# Patient Record
Sex: FEMALE | Race: OTHER | NOT HISPANIC OR LATINO | URBAN - METROPOLITAN AREA
[De-identification: names, ages, dates, MRNs, and addresses within clinical notes are randomized per-mention and may not be internally consistent; named-entity substitution may affect disease eponyms.]

---

## 2021-03-08 PROBLEM — Z00.00 ENCOUNTER FOR PREVENTIVE HEALTH EXAMINATION: Status: ACTIVE | Noted: 2021-03-08

## 2021-03-16 ENCOUNTER — EMERGENCY (EMERGENCY)
Facility: HOSPITAL | Age: 26
LOS: 0 days | Discharge: HOME | End: 2021-03-16
Attending: EMERGENCY MEDICINE | Admitting: EMERGENCY MEDICINE
Payer: COMMERCIAL

## 2021-03-16 VITALS
SYSTOLIC BLOOD PRESSURE: 175 MMHG | RESPIRATION RATE: 20 BRPM | OXYGEN SATURATION: 100 % | WEIGHT: 175.05 LBS | HEART RATE: 108 BPM | TEMPERATURE: 99 F | DIASTOLIC BLOOD PRESSURE: 84 MMHG

## 2021-03-16 VITALS
SYSTOLIC BLOOD PRESSURE: 116 MMHG | DIASTOLIC BLOOD PRESSURE: 64 MMHG | RESPIRATION RATE: 20 BRPM | OXYGEN SATURATION: 100 % | HEART RATE: 80 BPM | TEMPERATURE: 97 F

## 2021-03-16 DIAGNOSIS — O03.9 COMPLETE OR UNSPECIFIED SPONTANEOUS ABORTION WITHOUT COMPLICATION: ICD-10-CM

## 2021-03-16 DIAGNOSIS — N93.9 ABNORMAL UTERINE AND VAGINAL BLEEDING, UNSPECIFIED: ICD-10-CM

## 2021-03-16 LAB
ALBUMIN SERPL ELPH-MCNC: 4.2 G/DL — SIGNIFICANT CHANGE UP (ref 3.5–5.2)
ALP SERPL-CCNC: 71 U/L — SIGNIFICANT CHANGE UP (ref 30–115)
ALT FLD-CCNC: 18 U/L — SIGNIFICANT CHANGE UP (ref 0–41)
ANION GAP SERPL CALC-SCNC: 12 MMOL/L — SIGNIFICANT CHANGE UP (ref 7–14)
APPEARANCE UR: CLEAR — SIGNIFICANT CHANGE UP
APTT BLD: 30.2 SEC — SIGNIFICANT CHANGE UP (ref 27–39.2)
AST SERPL-CCNC: 21 U/L — SIGNIFICANT CHANGE UP (ref 0–41)
BASOPHILS # BLD AUTO: 0.05 K/UL — SIGNIFICANT CHANGE UP (ref 0–0.2)
BASOPHILS NFR BLD AUTO: 0.6 % — SIGNIFICANT CHANGE UP (ref 0–1)
BILIRUB SERPL-MCNC: <0.2 MG/DL — SIGNIFICANT CHANGE UP (ref 0.2–1.2)
BILIRUB UR-MCNC: NEGATIVE — SIGNIFICANT CHANGE UP
BLD GP AB SCN SERPL QL: SIGNIFICANT CHANGE UP
BUN SERPL-MCNC: 9 MG/DL — LOW (ref 10–20)
CALCIUM SERPL-MCNC: 9.3 MG/DL — SIGNIFICANT CHANGE UP (ref 8.5–10.1)
CHLORIDE SERPL-SCNC: 103 MMOL/L — SIGNIFICANT CHANGE UP (ref 98–110)
CO2 SERPL-SCNC: 24 MMOL/L — SIGNIFICANT CHANGE UP (ref 17–32)
COLOR SPEC: SIGNIFICANT CHANGE UP
CREAT SERPL-MCNC: 0.8 MG/DL — SIGNIFICANT CHANGE UP (ref 0.7–1.5)
DIFF PNL FLD: ABNORMAL
EOSINOPHIL # BLD AUTO: 0.15 K/UL — SIGNIFICANT CHANGE UP (ref 0–0.7)
EOSINOPHIL NFR BLD AUTO: 1.8 % — SIGNIFICANT CHANGE UP (ref 0–8)
GLUCOSE SERPL-MCNC: 98 MG/DL — SIGNIFICANT CHANGE UP (ref 70–99)
GLUCOSE UR QL: NEGATIVE — SIGNIFICANT CHANGE UP
HCG SERPL-ACNC: 14.9 MIU/ML — HIGH
HCT VFR BLD CALC: 41.7 % — SIGNIFICANT CHANGE UP (ref 37–47)
HGB BLD-MCNC: 13.5 G/DL — SIGNIFICANT CHANGE UP (ref 12–16)
IMM GRANULOCYTES NFR BLD AUTO: 0.2 % — SIGNIFICANT CHANGE UP (ref 0.1–0.3)
INR BLD: 1.07 RATIO — SIGNIFICANT CHANGE UP (ref 0.65–1.3)
KETONES UR-MCNC: NEGATIVE — SIGNIFICANT CHANGE UP
LEUKOCYTE ESTERASE UR-ACNC: NEGATIVE — SIGNIFICANT CHANGE UP
LIDOCAIN IGE QN: 45 U/L — SIGNIFICANT CHANGE UP (ref 7–60)
LYMPHOCYTES # BLD AUTO: 2.49 K/UL — SIGNIFICANT CHANGE UP (ref 1.2–3.4)
LYMPHOCYTES # BLD AUTO: 30.7 % — SIGNIFICANT CHANGE UP (ref 20.5–51.1)
MCHC RBC-ENTMCNC: 28.5 PG — SIGNIFICANT CHANGE UP (ref 27–31)
MCHC RBC-ENTMCNC: 32.4 G/DL — SIGNIFICANT CHANGE UP (ref 32–37)
MCV RBC AUTO: 88.2 FL — SIGNIFICANT CHANGE UP (ref 81–99)
MONOCYTES # BLD AUTO: 0.55 K/UL — SIGNIFICANT CHANGE UP (ref 0.1–0.6)
MONOCYTES NFR BLD AUTO: 6.8 % — SIGNIFICANT CHANGE UP (ref 1.7–9.3)
NEUTROPHILS # BLD AUTO: 4.85 K/UL — SIGNIFICANT CHANGE UP (ref 1.4–6.5)
NEUTROPHILS NFR BLD AUTO: 59.9 % — SIGNIFICANT CHANGE UP (ref 42.2–75.2)
NITRITE UR-MCNC: NEGATIVE — SIGNIFICANT CHANGE UP
NRBC # BLD: 0 /100 WBCS — SIGNIFICANT CHANGE UP (ref 0–0)
PH UR: 6.5 — SIGNIFICANT CHANGE UP (ref 5–8)
PLATELET # BLD AUTO: 267 K/UL — SIGNIFICANT CHANGE UP (ref 130–400)
POTASSIUM SERPL-MCNC: 4.2 MMOL/L — SIGNIFICANT CHANGE UP (ref 3.5–5)
POTASSIUM SERPL-SCNC: 4.2 MMOL/L — SIGNIFICANT CHANGE UP (ref 3.5–5)
PROT SERPL-MCNC: 6.9 G/DL — SIGNIFICANT CHANGE UP (ref 6–8)
PROT UR-MCNC: SIGNIFICANT CHANGE UP
PROTHROM AB SERPL-ACNC: 12.3 SEC — SIGNIFICANT CHANGE UP (ref 9.95–12.87)
RBC # BLD: 4.73 M/UL — SIGNIFICANT CHANGE UP (ref 4.2–5.4)
RBC # FLD: 11.8 % — SIGNIFICANT CHANGE UP (ref 11.5–14.5)
SODIUM SERPL-SCNC: 139 MMOL/L — SIGNIFICANT CHANGE UP (ref 135–146)
SP GR SPEC: 1.01 — SIGNIFICANT CHANGE UP (ref 1.01–1.03)
UROBILINOGEN FLD QL: SIGNIFICANT CHANGE UP
WBC # BLD: 8.11 K/UL — SIGNIFICANT CHANGE UP (ref 4.8–10.8)
WBC # FLD AUTO: 8.11 K/UL — SIGNIFICANT CHANGE UP (ref 4.8–10.8)

## 2021-03-16 PROCEDURE — 99285 EMERGENCY DEPT VISIT HI MDM: CPT

## 2021-03-16 PROCEDURE — 76830 TRANSVAGINAL US NON-OB: CPT | Mod: 26

## 2021-03-16 RX ORDER — SODIUM CHLORIDE 9 MG/ML
1000 INJECTION INTRAMUSCULAR; INTRAVENOUS; SUBCUTANEOUS ONCE
Refills: 0 | Status: COMPLETED | OUTPATIENT
Start: 2021-03-16 | End: 2021-03-16

## 2021-03-16 NOTE — ED PROVIDER NOTE - CLINICAL SUMMARY MEDICAL DECISION MAKING FREE TEXT BOX
24 yo female  LMP Feb 8 is about 5 weeks pregnant presents to the ER for vaginal bleeding, and pelvic pain today. States she noticed some bleeding this morning, went to see OB/GYN's office Dr Billy. Bedside US done and although they could not confirm IUP, they stated it could still be very early as her HCG a few days ago was 45. Pt instructed that if bleeding/cramping worsens to go to ER. Pt went home, and while home, states she passed a dime sized clot vaginally. Continued to have vaginal bleeding and more pelvic cramping so came to ER. Pt denies fever, chills, sob, n/v/d/cp/sob/back pain/dysuria. Pt on exam abdomen soft mild pelvic tenderness, no distension +BS, no masses, GYN os closed, mild-mod blood in vault, no cmt, no adnexal masses, no adnexal tenderness. Labs and US , UA reviewed. Pt with decreasing HCG. Pt with no IUP. CW complete miscarriage. Cervical os closed. No profuse amt of bleeding. Pt therefore dc'd and recommend follow up with her private gyn doctor in the office. Return for any worsening pain or bleeding.

## 2021-03-16 NOTE — ED PROVIDER NOTE - OBJECTIVE STATEMENT
24 y/o F with , 5 wks pregnant via LMP 2021 presents with moderate constant vaginal bleeding with dime sized clots and mild constant lower abdominal cramping pain x this am. no palliating/provoking factors. has only soaked through 1 pad today.   saw her OBGYN earlier who luis fernando a BHCG (results not back yet) and they did a TVUS without IUP visualized-- but at the time she went she did not have abdominal cramping/clots and was given ED precautions which is why she now presented here.  no fhx coaguloapthy/hormone use/IVF/IUI for this pregnancy.   no hx abdominal surgeries/GYN procedures.   OBGYN: Wenceslao

## 2021-03-16 NOTE — ED PROVIDER NOTE - PATIENT PORTAL LINK FT
You can access the FollowMyHealth Patient Portal offered by [facility] by registering at the following website: [email or web address]. By joining [employer] [facility]’s FollowMyHealth portal, you will also be able to view your health information using other applications (apps) compatible with our system.

## 2021-03-16 NOTE — ED PROVIDER NOTE - PHYSICAL EXAMINATION
PHYSICAL EXAM:    GENERAL: Alert, appears stated age, well appearing, non-toxic  SKIN: Warm, pink and dry. MMM.   HEAD: NC  EYE: Normal lids/conjunctiva  ENT: Normal hearing, patent oropharynx  NECK: +supple. No meningismus, or JVD  Pulm: Bilateral BS, normal resp effort, no wheezes, stridor, or retractions  CV: RRR, no M/R/G, 2+and = radial pulses  Abd: soft, non-tender, non-distended  GYN EXAM AS PER DR. ARASH Olivera: no erythema, cyanosis, edema. no calf tenderness  Neuro: AAOx3, normal gait.

## 2021-03-16 NOTE — ED PROVIDER NOTE - NSFOLLOWUPINSTRUCTIONS_ED_ALL_ED_FT
Miscarriage  A miscarriage is the loss of an unborn baby (fetus) before the 20th week of pregnancy. Most miscarriages happen during the first 3 months of pregnancy. Sometimes, a miscarriage can happen before a woman knows that she is pregnant.    Having a miscarriage can be an emotional experience. If you have had a miscarriage, talk with your health care provider about any questions you may have about miscarrying, the grieving process, and your plans for future pregnancy.    What are the causes?  A miscarriage may be caused by:    Problems with the genes or chromosomes of the fetus. These problems make it impossible for the baby to develop normally. They are often the result of random errors that occur early in the development of the baby, and are not passed from parent to child (not inherited).  Infection of the cervix or uterus.  Conditions that affect hormone balance in the body.  Problems with the cervix, such as the cervix opening and thinning before pregnancy is at term (cervical insufficiency).  Problems with the uterus. These may include:    A uterus with an abnormal shape.  Fibroids in the uterus.  Congenital abnormalities. These are problems that were present at birth.    Certain medical conditions.  Smoking, drinking alcohol, or using drugs.  Injury (trauma).    In many cases, the cause of a miscarriage is not known.    What are the signs or symptoms?  Symptoms of this condition include:    Vaginal bleeding or spotting, with or without cramps or pain.  Pain or cramping in the abdomen or lower back.  Passing fluid, tissue, or blood clots from the vagina.    How is this diagnosed?  This condition may be diagnosed based on:    A physical exam.  Ultrasound.  Blood tests.  Urine tests.    How is this treated?  Treatment for a miscarriage is sometimes not necessary if you naturally pass all the tissue that was in your uterus. If necessary, this condition may be treated with:    Dilation and curettage (D&C). This is a procedure in which the cervix is stretched open and the lining of the uterus (endometrium) is scraped. This is done only if tissue from the fetus or placenta remains in the body (incomplete miscarriage).  Medicines, such as:    Antibiotic medicine, to treat infection.  Medicine to help the body pass any remaining tissue.  Medicine to reduce (contract) the size of the uterus. These medicines may be given if you have a lot of bleeding.      If you have Rh negative blood and your baby was Rh positive, you will need a shot of a medicine called Rh immunoglobulinto protect your future babies from Rh blood problems. "Rh-negative" and "Rh-positive" refer to whether or not the blood has a specific protein found on the surface of red blood cells (Rh factor).    Follow these instructions at home:  Medicines     Take over-the-counter and prescription medicines only as told by your health care provider.  If you were prescribed antibiotic medicine, take it as told by your health care provider. Do not stop taking the antibiotic even if you start to feel better.  Image Do not take NSAIDs, such as aspirin and ibuprofen, unless they are approved by your health care provider. These medicines can cause bleeding.  Activity     Rest as directed. Ask your health care provider what activities are safe for you.  Have someone help with home and family responsibilities during this time.  General instructions     Keep track of the number of sanitary pads you use each day and how soaked (saturated) they are. Write down this information.  Monitor the amount of tissue or blood clots that you pass from your vagina. Save any large amounts of tissue for your health care provider to examine.  Do not use tampons, douche, or have sex until your health care provider approves.  To help you and your partner with the process of grieving, talk with your health care provider or seek counseling.  When you are ready, meet with your health care provider to discuss any important steps you should take for your health. Also, discuss steps you should take to have a healthy pregnancy in the future.  Keep all follow-up visits as told by your health care provider. This is important.  Where to find more information  The American Congress of Obstetricians and Gynecologists: www.acog.org  [place] Department of Health and Human Services Office of Women’s Health: www.womenshealth.gov  Contact a health care provider if:  You have a fever or chills.  You have a foul smelling vaginal discharge.  You have more bleeding instead of less.  Get help right away if:  You have severe cramps or pain in your back or abdomen.  You pass blood clots or tissue from your vagina that is walnut-sized or larger.  You soak more than 1 regular sanitary pad in an hour.  You become light-headed or weak.  You pass out.  You have feelings of sadness that take over your thoughts, or you have thoughts of hurting yourself.  Summary  Most miscarriages happen in the first 3 months of pregnancy. Sometimes miscarriage happens before a woman even knows that she is pregnant.  Follow your health care provider's instruction for home care. Keep all follow-up appointments.  To help you and your partner with the process of grieving, talk with your health care provider or seek counseling.  This information is not intended to replace advice given to you by your health care provider. Make sure you discuss any questions you have with your health care provider.

## 2021-03-16 NOTE — ED PROVIDER NOTE - ATTENDING CONTRIBUTION TO CARE
26 yo female  LMP  is about 5 weeks pregnant presents to the ER for vaginal bleeding, and pelvic pain today. States she noticed some bleeding this morning, went to see OB/GYN's office Dr Billy. Bedside US done and although they could not confirm IUP, they stated it could still be very early as her HCG a few days ago was 45. Pt instructed that if bleeding/cramping worsens to go to ER. Pt went home, and while home, states she passed a dime sized clot vaginally. Continued to have vaginal bleeding and more pelvic cramping so came to ER. Pt denies fever, chills, sob, n/v/d/cp/sob/back pain/dysuria. Pt on exam abdomen soft mild pelvic tenderness, no distension +BS, no masses, GYN os closed, mild-mod blood in vault, no cmt, no adnexal masses, no adnexal tenderness. Ordered labs, urine, US. To reassess after test results.     ALL: nkda  PMH as above  Meds denies  SH no smoking or etoh  PMD Wenceslao  LMP

## 2021-03-16 NOTE — ED PROVIDER NOTE - PROGRESS NOTE DETAILS
Luisana: GYN exam: +mild-mod amt blood in vault, os closed, no adnexal mass or tenderness  Pharmacy info: [address] (will send script for abx if urine +for UTI) OCHOA: pt has appointment with OBGYN again on thursday and knows need to repeat BHCG to negative. pt also understands that this is very likely miscarriage. Reviewed all results and necessity for follow up. Counseled on red flags and to return for them.  Patient appears well on discharge.

## 2021-03-16 NOTE — ED PROVIDER NOTE - NS ED ROS FT
Review of Systems    Constitutional: (-) fever   Eyes/ENT: (-) vision changes  Cardiovascular: (-) chest pain, (-) syncope (-) palpitations  Respiratory: (-) cough, (-) shortness of breath  Gastrointestinal: (-) vomiting, (-) diarrhea (-)black/bloody stools (+) abdominal pain  Genitourinary:  (-) dysuria   Musculoskeletal: (-) neck pain, (-) back pain, (-) leg pain/swelling  Integumentary: (-) rash, (-) edema  Neurological: (-) headache, (-) confusion  Hematologic: (-) easy bruising

## 2021-03-16 NOTE — ED ADULT TRIAGE NOTE - SOURCE OF INFORMATION
MECHE MILLER Male 89y [id number]    Patient is a 89y old  Male who presents with a chief complaint of syncope (22 Jan 2020 10:00)      Subjective/objective:  Pt seen and examined at bedside, no over night event reported by night staff. Pt sitting comfortably, he states feels better, no stomach pain today. No SOB or leg edema. Afib rate controlled on telemetry.     Review of system:  No fever, chills, nausea, vomiting, headache, dizziness, chest pain.      PHYSICAL EXAM:    Vital Signs Last 24 Hrs  T(C): 36.5 (22 Jan 2020 09:14), Max: 36.7 (21 Jan 2020 23:02)  T(F): 97.7 (22 Jan 2020 09:14), Max: 98.1 (22 Jan 2020 06:27)  HR: 96 (22 Jan 2020 09:14) (93 - 111)  BP: 100/64 (22 Jan 2020 09:14) (100/64 - 118/70)  BP(mean): --  RR: 18 (22 Jan 2020 09:14) (16 - 18)  SpO2: 98% (22 Jan 2020 09:14) (96% - 98%)    GENERAL: Pt sitting comfortably, NAD.  CHEST/LUNG: Clear to auscultation bilaterally; No wheezing.  HEART: S1S2+, irregular, No murmurs.  ABDOMEN: Soft, Nontender, Nondistended; Bowel sounds present.  Extremities: LE edema, pulses+  NEURO: AAOX3, no focal deficits, no motor r sensory loss.  PSYCH: normal mood.    MEDICATIONS  (STANDING):  enoxaparin Injectable 40 milliGRAM(s) SubCutaneous daily  metoprolol tartrate 50 milliGRAM(s) Oral every 6 hours  multivitamin 1 Tablet(s) Oral daily  ofloxacin 0.3% Solution 1 Drop(s) Both EYES four times a day  pantoprazole    Tablet 40 milliGRAM(s) Oral before breakfast  PRESERVISION 2 Tablet(s) 2 Tablet(s) Oral daily  sacubitril 24 mG/valsartan 26 mG 1 Tablet(s) Oral two times a day  tamsulosin 0.4 milliGRAM(s) Oral at bedtime  warfarin 3 milliGRAM(s) Oral once    MEDICATIONS  (PRN):  acetaminophen   Tablet .. 625 milliGRAM(s) Oral every 6 hours PRN Mild Pain (1 - 3)  metoprolol tartrate Injectable 5 milliGRAM(s) IV Push every 6 hours PRN HR > 140 sustained        Labs:  LABS:                        11.5   11.11 )-----------( 143      ( 21 Jan 2020 05:56 )             34.8     01-21    139  |  98  |  28.0<H>  ----------------------------<  101  4.5   |  29.0  |  1.01    Ca    9.1      21 Jan 2020 05:56      PT/INR - ( 22 Jan 2020 10:55 )   PT: 12.8 sec;   INR: 1.11 ratio Patient

## 2021-03-17 LAB
BACTERIA # UR AUTO: NEGATIVE — SIGNIFICANT CHANGE UP
EPI CELLS # UR: 0 /HPF — SIGNIFICANT CHANGE UP (ref 0–5)
HYALINE CASTS # UR AUTO: 0 /LPF — SIGNIFICANT CHANGE UP (ref 0–7)
RBC CASTS # UR COMP ASSIST: 1 /HPF — SIGNIFICANT CHANGE UP (ref 0–4)
WBC UR QL: 0 /HPF — SIGNIFICANT CHANGE UP (ref 0–5)

## 2021-03-18 LAB
CULTURE RESULTS: SIGNIFICANT CHANGE UP
SPECIMEN SOURCE: SIGNIFICANT CHANGE UP

## 2025-04-11 ENCOUNTER — NON-APPOINTMENT (OUTPATIENT)
Age: 30
End: 2025-04-11